# Patient Record
Sex: FEMALE | Race: WHITE | NOT HISPANIC OR LATINO | URBAN - METROPOLITAN AREA
[De-identification: names, ages, dates, MRNs, and addresses within clinical notes are randomized per-mention and may not be internally consistent; named-entity substitution may affect disease eponyms.]

---

## 2021-06-28 ENCOUNTER — OFFICE (OUTPATIENT)
Dept: URBAN - METROPOLITAN AREA CLINIC 72 | Facility: CLINIC | Age: 46
End: 2021-06-28

## 2021-06-28 VITALS
DIASTOLIC BLOOD PRESSURE: 70 MMHG | HEART RATE: 78 BPM | HEIGHT: 62 IN | WEIGHT: 118 LBS | SYSTOLIC BLOOD PRESSURE: 118 MMHG

## 2021-06-28 DIAGNOSIS — K52.9 NONINFECTIVE GASTROENTERITIS AND COLITIS, UNSPECIFIED: ICD-10-CM

## 2021-06-28 DIAGNOSIS — K58.0 IRRITABLE BOWEL SYNDROME WITH DIARRHEA: ICD-10-CM

## 2021-06-28 DIAGNOSIS — R10.30 LOWER ABDOMINAL PAIN, UNSPECIFIED: ICD-10-CM

## 2021-06-28 PROCEDURE — 99203 OFFICE O/P NEW LOW 30 MIN: CPT | Performed by: INTERNAL MEDICINE

## 2021-06-28 RX ORDER — HYOSCYAMINE SULFATE 0.12 MG/1
0.38 TABLET ORAL; SUBLINGUAL
Qty: 30 | Refills: 3 | Status: ACTIVE
Start: 2021-06-28

## 2021-06-28 NOTE — SERVICENOTES
Our goal is to partner with you to improve your health and well being. It is important for you to complete necessary testing and follow the instructions given to you at your clinic visit. Our office will call you within 2 weeks with results of any testing but you may also call sooner to obtain results - [phone].   If you have any questions or concerns please feel free to call us.  We take your care very seriously and we thank you for your trust!
- schedule EGD with biopsy (disacchridase)
- schedule colonoscopy
- SIBO breath test
-, - schedule colonoscopy, if you have any issues with the prep (ie high cost, not covered) at the pharmacy please let us know
- , start hyoscyamine/levsin 1 pill sublingual up to every 8 hours as needed for abdominal cramping or pain.
- follow up 2 weeks after procedures.

## 2021-06-28 NOTE — SERVICEHPINOTES
Cristel Aguilar   is seen for an initial visit today.  
ada caballero     She had an EGD and colonosocpy in 2010.  I don't have full results but do have pictures that look normal and note made of small bowel biopsy that was normal. 
ada caballero  As a kid she had a lot of antibiotics for tonsillitis, she had mono, chicken pox and a really bad stomach virus.  She had work up including colonsocopy and was told it was normal.  She also discovered she was lactose and gluten intolerant so she cut those out.  SHe continued to have gas pains, bloating, diarrhea.  She tried low FODMAP diet.  With all those eliminations she feels better.  Occasionally she still has problem.  
ada caballero  She did xifaxin in 2019 and she had a really good result while on it.  She doesn't know how long those symptoms were improved for.  It may have only been a month or two but it was "miraculous" ?
ada caballero   She would like to have a colonsocopy. 
ada caballero  My nurse has reviewed and updated the medication list with the patient. I have reviewed the medication list along with the documented medical, social and family history. Pertinent details are also noted above in the HPI.

## 2021-08-09 ENCOUNTER — AMBULATORY SURGICAL CENTER (OUTPATIENT)
Dept: URBAN - METROPOLITAN AREA SURGERY 19 | Facility: SURGERY | Age: 46
End: 2021-08-09

## 2021-08-09 ENCOUNTER — OFFICE (OUTPATIENT)
Dept: URBAN - METROPOLITAN AREA PATHOLOGY 24 | Facility: PATHOLOGY | Age: 46
End: 2021-08-09

## 2021-08-09 DIAGNOSIS — R19.7 DIARRHEA, UNSPECIFIED: ICD-10-CM

## 2021-08-09 DIAGNOSIS — K64.8 OTHER HEMORRHOIDS: ICD-10-CM

## 2021-08-09 DIAGNOSIS — K52.9 NONINFECTIVE GASTROENTERITIS AND COLITIS, UNSPECIFIED: ICD-10-CM

## 2021-08-09 LAB
COLONOSCOPY STUDY: (no result)
COLONOSCOPY STUDY: (no result)
RELEVANT H&P ENDOSCOPY: (no result)
RELEVANT H&P ENDOSCOPY: (no result)

## 2021-08-09 PROCEDURE — 88305 TISSUE EXAM BY PATHOLOGIST: CPT | Performed by: INTERNAL MEDICINE

## 2021-08-09 PROCEDURE — 45380 COLONOSCOPY AND BIOPSY: CPT | Performed by: INTERNAL MEDICINE

## 2021-08-09 PROCEDURE — 43239 EGD BIOPSY SINGLE/MULTIPLE: CPT | Performed by: INTERNAL MEDICINE

## 2021-08-09 PROCEDURE — 88342 IMHCHEM/IMCYTCHM 1ST ANTB: CPT | Performed by: INTERNAL MEDICINE

## 2021-08-23 ENCOUNTER — OFFICE (OUTPATIENT)
Dept: URBAN - METROPOLITAN AREA CLINIC 72 | Facility: CLINIC | Age: 46
End: 2021-08-23

## 2021-08-23 VITALS
HEART RATE: 69 BPM | HEIGHT: 62 IN | WEIGHT: 119 LBS | DIASTOLIC BLOOD PRESSURE: 70 MMHG | SYSTOLIC BLOOD PRESSURE: 104 MMHG

## 2021-08-23 DIAGNOSIS — K58.0 IRRITABLE BOWEL SYNDROME WITH DIARRHEA: ICD-10-CM

## 2021-08-23 DIAGNOSIS — R10.30 LOWER ABDOMINAL PAIN, UNSPECIFIED: ICD-10-CM

## 2021-08-23 DIAGNOSIS — R14.0 ABDOMINAL DISTENSION (GASEOUS): ICD-10-CM

## 2021-08-23 DIAGNOSIS — K63.89 OTHER SPECIFIED DISEASES OF INTESTINE: ICD-10-CM

## 2021-08-23 PROCEDURE — 99212 OFFICE O/P EST SF 10 MIN: CPT | Performed by: INTERNAL MEDICINE

## 2021-08-23 NOTE — SERVICENOTES
Our goal is to partner with you to improve your health and well being. It is important for you to complete necessary testing and follow the instructions given to you at your clinic visit. Our office will call you within 2 weeks with results of any testing but you may also call sooner to obtain results [phone].   If you have any questions or concerns please feel free to call.  We take your care very seriously and we thank you for your trust!
- complete xifaxin
- trial of stopping the probiotics.  If you feel like you get worse you can always restart
- Trial of adding back some foods slowly and carefully
- If bloating returns quickly I would recommend repeat SIBO testing to see if xifaxin did not completely clear it. 
- FOllow up in 3 months, sooner if needed.  
- repeat colonoscopy 10 years.

## 2021-08-23 NOTE — SERVICEHPINOTES
Cristel Aguilar   is seen today for a follow-up visit.  
br
She had an EGD and colonosocpy in 2010.  I don't have full results but do have pictures that look normal and note made of small bowel biopsy that was normal. As a kid she had a lot of antibiotics for tonsillitis, she had mono, chicken pox and a really bad stomach virus.  She had work up including colonsocopy and was told it was normal.  She also discovered she was lactose and gluten intolerant so she cut those out.  SHe continued to have gas pains, bloating, diarrhea.  She tried low FODMAP diet.  With all those eliminations she feels better.  Occasionally she still has problem. She did xifaxin in 2019 and she had a really good result while on it.  She doesn't know how long those symptoms were improved for.  It may have only been a month or two but it was "miraculous"?She would like to have a colonsocopy. br     br  Plan from last visit:
br
br- schedule EGD with biopsy (disacchridase)br- schedule colonoscopybr- SIBO breath testbr-, - schedule colonoscopy, if you have any issues with the prep (ie high cost, not covered) at the pharmacy please let us knowbr- , start hyoscyamine/levsin 1 pill sublingual up to every 8 hours as needed for abdominal cramping or pain.br- follow up 2 weeks after procedures.  Interval History:  8/23/2021   
br   SIBO test positive
br EGD/Colonoscopy normal with normal biopsy
br She is still on the xifaxin, she has one pill left. 
brShe is feeling much better on it.  
br She has been on the FODMAP diet for a long time, when she is off it she feels horrible.  ?My nurse has reviewed and updated the medication list with the patient (medication reconciliation). I have also reviewed the medication list. New updates were made to the patient's medical, social and family history. Pertinent details are also noted above in the HPI.br visited="true"

## 2021-11-15 ENCOUNTER — OFFICE (OUTPATIENT)
Dept: URBAN - METROPOLITAN AREA CLINIC 72 | Facility: CLINIC | Age: 46
End: 2021-11-15

## 2021-11-15 VITALS
SYSTOLIC BLOOD PRESSURE: 96 MMHG | HEIGHT: 62 IN | HEART RATE: 68 BPM | DIASTOLIC BLOOD PRESSURE: 58 MMHG | WEIGHT: 120 LBS | TEMPERATURE: 98.1 F

## 2021-11-15 DIAGNOSIS — K63.89 OTHER SPECIFIED DISEASES OF INTESTINE: ICD-10-CM

## 2021-11-15 DIAGNOSIS — R14.0 ABDOMINAL DISTENSION (GASEOUS): ICD-10-CM

## 2021-11-15 DIAGNOSIS — K58.0 IRRITABLE BOWEL SYNDROME WITH DIARRHEA: ICD-10-CM

## 2021-11-15 PROCEDURE — 99213 OFFICE O/P EST LOW 20 MIN: CPT | Performed by: INTERNAL MEDICINE

## 2021-11-15 NOTE — SERVICENOTES
Our goal is to partner with you to improve your health and well being. It is important for you to complete necessary testing and follow the instructions given to you at your clinic visit. Our office will call you within 2 weeks with results of any testing but you may also call sooner to obtain results [phone].   If you have any questions or concerns please feel free to call. 
 We take your care very seriously and we thank you for your trust!
- Try digestive enzymes with meals that contain higher fat
- get stool test for giardia and fat 
- call me if symptoms start to reoccur and we will do another course of xifaxin.

## 2021-11-15 NOTE — SERVICEHPINOTES
Cristel Aguilar   is seen today for a follow-up visit. 
br
br 
46 year old who we follow for IBS type symptoms with positive SIBO testbr
Justyn had an EGD and colonoscopy in 2010. I don't have full results but do have pictures that look normal and note made of small bowel biopsy that was normal.As a kid she had a lot of antibiotics for tonsillitis, she had mono, chicken pox and a really bad stomach virus. She had work up including colonoscopy and was told it was normal. She also discovered she was lactose and gluten intolerant so she cut those out. SHe continued to have gas pains, bloating, diarrhea. She tried low FODMAP diet. With all those eliminations she feels better. Occasionally she still has problem.She did xifaxin in 2019 and she had a really good result while on it. She doesn't know how long those symptoms were improved for. It may have only been a month or two but it was "miraculous"?She would like to have a colonoscopy.Interval History:8/23/2021 brSIBO test positivebrEGD/Colonoscopy normal with normal biopsybrShe is still on the xifaxin, she has one pill left.Justyn is feeling much better on it. Justyn has been on the FODMAP diet for a long time, when she is off it she feels horrible. ?    br  Plan from last visit:
br
br complete xifaxinbr- trial of stopping the probiotics. If you feel like you get worse you can always restartbr- Trial of adding back some foods slowly and carefullybr- If bloating returns quickly I would recommend repeat SIBO testing to see if xifaxin did not completely clear it. br- FOllow up in 3 months, sooner if needed. br- repeat colonoscopy 10 years. Interval History:  11/15/2021   
br
br   Overall she is feeling good. 
br Bloating has been much better. 
br She has tried a couple things in terms of FODMAP. 
br SHe has gone out to a resteraunt. ?
br
br She does note that high fat foods cause diarrhea.  She is concered about adding foods back in. My nurse has reviewed and updated the medication list with the patient (medication reconciliation). I have also reviewed the medication list. New updates were made to the patient's medical, social and family history. Pertinent details are also noted above in the HPI.br visited="true"

## 2022-09-23 ENCOUNTER — OFFICE (OUTPATIENT)
Dept: URBAN - METROPOLITAN AREA CLINIC 72 | Facility: CLINIC | Age: 47
End: 2022-09-23

## 2022-09-23 VITALS
SYSTOLIC BLOOD PRESSURE: 114 MMHG | HEART RATE: 70 BPM | WEIGHT: 127 LBS | DIASTOLIC BLOOD PRESSURE: 74 MMHG | HEIGHT: 62 IN

## 2022-09-23 DIAGNOSIS — K90.9 INTESTINAL MALABSORPTION, UNSPECIFIED: ICD-10-CM

## 2022-09-23 DIAGNOSIS — K86.81 EXOCRINE PANCREATIC INSUFFICIENCY: ICD-10-CM

## 2022-09-23 DIAGNOSIS — K63.89 OTHER SPECIFIED DISEASES OF INTESTINE: ICD-10-CM

## 2022-09-23 PROCEDURE — 99214 OFFICE O/P EST MOD 30 MIN: CPT | Performed by: INTERNAL MEDICINE

## 2022-09-23 RX ORDER — PANCRELIPASE LIPASE, PANCRELIPASE PROTEASE, PANCRELIPASE AMYLASE 40000; 126000; 168000 [USP'U]/1; [USP'U]/1; [USP'U]/1
CAPSULE, DELAYED RELEASE ORAL
Qty: 120 | Refills: 6 | Status: ACTIVE
Start: 2022-09-23

## 2022-09-23 NOTE — SERVICEHPINOTES
Cristel Aguilar   is seen today for a follow-up visit.  
br
br47 year old who we follow for IBS type symptoms with positive SIBO testShe had an EGD and colonoscopy in 2010. I don't have full results but do have pictures that look normal and note made of small bowel biopsy that was normal.As a kid she had a lot of antibiotics for tonsillitis, she had mono, chicken pox and a really bad stomach virus. She had work up including colonoscopy and was told it was normal. She also discovered she was lactose and gluten intolerant so she cut those out. SHe continued to have gas pains, bloating, diarrhea. She tried low FODMAP diet. With all those eliminations she feels better. Occasionally she still has problem.She did xifaxin in 2019 and she had a really good result while on it. She doesn't know how long those symptoms were improved for. It may have only been a month or two but it was "miraculous"?She would like to have a colonoscopy.Interval History:8/23/2021brSIBO test positivebrEGD/Colonoscopy normal with normal biopsybrShe is still on the xifaxin, she has one pill left.Justyn is feeling much better on it.Jusytn has been on the FODMAP diet for a long time, when she is off it she feels horrible.?brInterval History:11/15/2021Overall she is feeling good.brBloating has been much better.Justyn has tried a couple things in terms of FODMAP.brSJose has gone out to a resteraunt.?She does note that high fat foods cause diarrhea. She is concered about adding foods back in.    br  Plan from last visit:
br Try digestive enzymes with meals that contain higher fatbr- get stool test for giardia and fat br- call me if symptoms start to reoccur and we will do another course of xifaxin. Interval History:  9/23/2022   
br   fecal fat increased (both normal and free), giardia neg
br CT showed normal pancreas
brWe started her on some creon and she felt like it really helped a lotbr I gave her another course of xifaxin in 8/22
br She takes the creon with every meal and that has helped her be able to tolerate things better.  It has taken pain and bloating away.  brShe is taking natures bounty 10. 
br SHe has required a couple course of xifaxin.  Most recent symtpoms were bloating, nausea, belching and bloating.  My nurse has reviewed and updated the medication list with the patient (medication reconciliation). I have also reviewed the medication list. New updates were made to the patient's medical, social and family history. Pertinent details are also noted above in the HPI.br visited="true"

## 2022-09-23 NOTE — SERVICENOTES
Our goal is to partner with you to improve your health and well being. It is important for you to complete necessary testing and follow the instructions given to you at your clinic visit. Our office will call you within 2 weeks with results of any testing but you may also call sooner to obtain results [phone].   If you have any questions or concerns please feel free to call.  We take your care very seriously and we thank you for your trust!
- we will do creon paperwork
- I also put honorio  prescription for zenpep so we can see if that is any cheaper
- if coverage is better we can also use pancreaze
- labs and stool study and follow up at least yearly
- If symptoms come back again please do SIBO test and then let us know so we can start xifaxin (we will order SIBO test now)

## 2023-02-24 ENCOUNTER — OFFICE (OUTPATIENT)
Dept: URBAN - METROPOLITAN AREA CLINIC 72 | Facility: CLINIC | Age: 48
End: 2023-02-24

## 2023-02-24 VITALS
SYSTOLIC BLOOD PRESSURE: 116 MMHG | OXYGEN SATURATION: 99 % | DIASTOLIC BLOOD PRESSURE: 78 MMHG | HEIGHT: 62 IN | HEART RATE: 84 BPM | WEIGHT: 130 LBS

## 2023-02-24 DIAGNOSIS — K58.0 IRRITABLE BOWEL SYNDROME WITH DIARRHEA: ICD-10-CM

## 2023-02-24 DIAGNOSIS — K63.89 OTHER SPECIFIED DISEASES OF INTESTINE: ICD-10-CM

## 2023-02-24 DIAGNOSIS — K86.81 EXOCRINE PANCREATIC INSUFFICIENCY: ICD-10-CM

## 2023-02-24 PROCEDURE — 99213 OFFICE O/P EST LOW 20 MIN: CPT | Performed by: INTERNAL MEDICINE

## 2023-02-24 NOTE — SERVICENOTES
Our goal is to partner with you to improve your health and well being. It is important for you to complete necessary testing and follow the instructions given to you at your clinic visit. Our office will call you within 2 weeks with results of any testing but you may also call sooner to obtain results [phone].   If you have any questions or concerns please feel free to call.  We take your care very seriously and we thank you for your trust!
- When SIBO symptoms return we can try the doxyclyine instead of the xyfaxin and if needed
- Otherwise if you dont have return of symptoms let me know in May and I will put in a prescriptin for xifaxin for you to have on hand for travel.

## 2023-02-24 NOTE — SERVICEHPINOTES
Cristel Aguilar   is seen today for a follow-up visit. 47 year old who we follow for IBS type symptoms with positive SIBO test and fecal fat possible Merlin had an EGD and colonoscopy in 2010. I don't have full results but do have pictures that look normal and note made of small bowel biopsy that was normal.As a kid she had a lot of antibiotics for tonsillitis, she had mono, chicken pox and a really bad stomach virus. She had work up including colonoscopy and was told it was normal. She also discovered she was lactose and gluten intolerant so she cut those out. SHe continued to have gas pains, bloating, diarrhea. She tried low FODMAP diet. With all those eliminations she feels better. Occasionally she still has problem.She did xifaxin in 2019 and she had a really good result while on it. She doesn't know how long those symptoms were improved for. It may have only been a month or two but it was "miraculous"?She would like to have a colonoscopy.Interval History:8/23/2021brSIBO test positivebrEGD/Colonoscopy normal with normal biopsybrJuanis is still on the xifaxin, she has one pill left.Justyn is feeling much better on it.Justyn has been on the FODMAP diet for a long time, when she is off it she feels horrible.?brInterval History:11/15/2021Overall she is feeling good.brBloating has been much better.Justyn has tried a couple things in terms of FODMAP.Justyn has gone out to a resteraunt.?She does note that high fat foods cause diarrhea. She is concered about adding foods back in.Interval History:9/23/2022brfecal fat increased (both normal and free), giardia negbrCT showed normal pancreasbrMp started her on some creon and she felt like it really helped a lotbrI gave her another course of xifaxin in 8/22brJuanis takes the creon with every meal and that has helped her be able to tolerate things better. It has taken pain and bloating away. Justyn is taking natures bounty 10. Justyn has required a couple course of xifaxin. Most recent symtpoms were bloating, nausea, belching and bloating.    br  Plan from last visit:
br
br- we will do creon paperworkbr- I also put honorio prescription for zenpep so we can see if that is any cheaperbr- if coverage is better we can also use pancreazebr- labs and stool study and follow up at least yearlybr- If symptoms come back again please do SIBO test and then let us know so we can start xifaxin (we will order SIBO test now) Interval History:  2/24/2023   
br   SIBO breath test was positive She took the xifaxin and she felt like symtpoms came right back.  I ordered doxycycline, she didnt take it.  Symtpoms improved.  br She has taken a total of 5 ties it was 11/2019, 8/2021, 4/2022, 8/2022, 12/2022.  
br br we switched her to zenpep and it worked well.  She is currently on zenpep and she takes it with meals.  
br br She is feeling good overall, she has some days that she has symptoms but overall doing well.  
br 
She has picked up oil of oregano pills but has not started taking it.  br Her daughter who is 19 was recently diagnosed with SIBO as well. 
br She has lots of questions regarding SIBO and diet, supplements, medications ect. 
ada Herman is going on a 2 week trip in June and is concerned about possible symtpomsbr
br br My nurse has reviewed and updated the medication list with the patient (medication reconciliation). I have also reviewed the medication list. New updates were made to the patient's medical, social and family history. Pertinent details are also noted above in the HPI.br visited="true"